# Patient Record
Sex: MALE | URBAN - METROPOLITAN AREA
[De-identification: names, ages, dates, MRNs, and addresses within clinical notes are randomized per-mention and may not be internally consistent; named-entity substitution may affect disease eponyms.]

---
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
## 2022-01-19 ENCOUNTER — RX ONLY (OUTPATIENT)
Age: 22
Setting detail: RX ONLY
End: 2022-01-19

## 2022-01-19 RX ORDER — ISOTRETINOIN 40 MG/1
1 CAPSULE ORAL BID
Qty: 60 | Refills: 0 | Status: ERX

## 2022-02-28 ENCOUNTER — APPOINTMENT (RX ONLY)
Dept: URBAN - METROPOLITAN AREA CLINIC 114 | Facility: CLINIC | Age: 22
Setting detail: DERMATOLOGY
End: 2022-02-28

## 2022-02-28 DIAGNOSIS — L70.0 ACNE VULGARIS: ICD-10-CM

## 2022-02-28 DIAGNOSIS — Z79.899 OTHER LONG TERM (CURRENT) DRUG THERAPY: ICD-10-CM

## 2022-02-28 DIAGNOSIS — K13.0 DISEASES OF LIPS: ICD-10-CM

## 2022-02-28 DIAGNOSIS — L85.3 XEROSIS CUTIS: ICD-10-CM

## 2022-02-28 PROCEDURE — ? PRESCRIPTION MEDICATION MANAGEMENT

## 2022-02-28 PROCEDURE — ? ORDER TESTS

## 2022-02-28 PROCEDURE — ? PRESCRIPTION

## 2022-02-28 PROCEDURE — 99214 OFFICE O/P EST MOD 30 MIN: CPT

## 2022-02-28 PROCEDURE — ? TREATMENT REGIMEN

## 2022-02-28 PROCEDURE — ? LAB REPORTS REVIEWED

## 2022-02-28 PROCEDURE — ? ISOTRETINOIN MONITORING

## 2022-02-28 PROCEDURE — ? COUNSELING

## 2022-02-28 PROCEDURE — ? MORPHOLOGY

## 2022-02-28 PROCEDURE — ? DIAGNOSIS COMMENT

## 2022-02-28 RX ORDER — ISOTRETINOIN 40 MG/1
CAPSULE ORAL
Qty: 60 | Refills: 0 | Status: ERX

## 2022-02-28 ASSESSMENT — LOCATION DETAILED DESCRIPTION DERM
LOCATION DETAILED: LEFT MID-UPPER BACK
LOCATION DETAILED: LEFT SUPERIOR VERMILION LIP
LOCATION DETAILED: RIGHT INFERIOR VERMILION LIP

## 2022-02-28 ASSESSMENT — LOCATION ZONE DERM
LOCATION ZONE: TRUNK
LOCATION ZONE: LIP

## 2022-02-28 ASSESSMENT — LOCATION SIMPLE DESCRIPTION DERM
LOCATION SIMPLE: LEFT LIP
LOCATION SIMPLE: LEFT UPPER BACK
LOCATION SIMPLE: RIGHT LIP

## 2022-02-28 NOTE — PROCEDURE: ORDER TESTS
Lab Facility: [id number]
Billing Type: Third-Party Bill
Performing Laboratory: [id number]
Bill For Surgical Tray: no
Expected Date Of Service: 03/25/2022

## 2022-02-28 NOTE — PROCEDURE: DIAGNOSIS COMMENT
Detail Level: Simple
Comment: Pt stated that his insurance “wouldn’t cover the higher dosage of 40mg PO BID” so he just didn’t fill the prescription or notify the office. Therefore, he has not been on medication for the last month. Discussed importance of letting us know if he is unable to fill prescription so changes can be made. His TG have decreased, but recommended he start fish oil at this time.
Render Risk Assessment In Note?: no

## 2022-02-28 NOTE — PROCEDURE: ISOTRETINOIN MONITORING
Nosebleeds Normal Treatment: I explained this is common when taking isotretinoin. I recommended saline mist in each nostril multiple times a day. If this worsens they will contact us.
Xerosis Normal Treatment: I recommended application of Cetaphil or CeraVe numerous times a day and before going to bed to all dry areas.
Detail Level: Zone
Dosing Month 1 (Required For Cumulative Dosing): 30mg BID
Male Completion Statement: After discussing his treatment course we decided to discontinue isotretinoin therapy at this time. He shouldn't donate blood for one month after the last dose. He should call with any new symptoms of depression.
Myalgia Monitoring: I explained this is common when taking isotretinoin. If this worsens they will contact us.
Use Enhanced Counseling Feature (Automatic): No
Cheilitis Aggressive Treatment: I recommended application of Vaseline or Aquaphor numerous times a day (as often as every hour) and before going to bed. I also prescribed a topical steroid for twice daily use.
Myalgia Treatment: I explained this is common when taking isotretinoin. If this worsens they will contact us. They may try OTC ibuprofen.
Weight Units: pounds
Headache Monitoring: I recommended monitoring the headaches for now. There is no evidence of increased intracranial pressure. They were instructed to call if the headaches are worsening.
What Is The Patient's Gender: Male
Xerosis Normal Treatment: I recommended application of Cetaphil or CeraVe numerous times a day going to bed to all dry areas.
Display Individual Monthly Dosage In The Note (If Yes Will Display All Dosages Which Are Not N/A): yes
Dosing Month 3 (Required For Cumulative Dosing): 40mg BID
Female Completion Statement: After discussing her treatment course we decided to discontinue isotretinoin therapy at this time. I explained that she would need to continue her birth control methods for at least one month after the last dosage. She should also get a pregnancy test one month after the last dose. She shouldn't donate blood for one month after the last dose. She should call with any new symptoms of depression.
Pounds Preamble Statement (Weight Entered In Details Tab): Reported Weight in pounds:
Retinoid Dermatitis Aggressive Treatment: I recommended more frequent application of Cetaphil or CeraVe to the areas of dermatitis. I also prescribed a topical steroid for twice daily use until the dermatitis resolves.
Months Of Therapy Completed: 1
Hypercholesterolemia Monitoring: I explained this is common when taking isotretinoin. We will monitor closely.
Kilograms Preamble Statement (Weight Entered In Details Tab): Reported Weight in kilograms:
Cheilitis Normal Treatment: I recommended application of Vaseline or Aquaphor numerous times a day (as often as every hour) and before going to bed.
Patient Weight (Optional But Required For Cumulative Dose-Numbers And Decimals Only): 180
Retinoid Dermatitis Normal Treatment: I recommended more frequent application of Cetaphil or CeraVe to the areas of dermatitis.
Counseling Text: I reviewed the side effect in detail. Patient should get monthly blood tests, not donate blood, not drive at night if vision affected, and not share medication.
Female Pregnancy Counseling Text: Female patients should also be on two forms of birth control while taking this medication and for one month after their last dose.
Xerosis Aggressive Treatment: I recommended application of Cetaphil or CeraVe numerous times a day going to bed to all dry areas. I also prescribed a topical steroid for twice daily use.
Xerosis Aggressive Treatment: I recommended application of Cetaphil or CeraVe numerous times a day and before going to bed to all dry areas. I also prescribed a topical steroid for twice daily use.
Target Cumulative Dosage (In Mg/Kg): 135
Use Therapeutic Ranged Or Therapeutic Target: please select Range or Target
Lower Range (In Mg/Kg): 120
Upper Range (In Mg/Kg): 150

## 2022-02-28 NOTE — PROCEDURE: ISOTRETINOIN MONITORING
Kilograms Preamble Statement (Weight Entered In Details Tab): Reported Weight in kilograms:
Female Completion Statement: After discussing her treatment course we decided to discontinue isotretinoin therapy at this time. I explained that she would need to continue her birth control methods for at least one month after the last dosage. She should also get a pregnancy test one month after the last dose. She shouldn't donate blood for one month after the last dose. She should call with any new symptoms of depression.
Dosing Month 1 (Required For Cumulative Dosing): 30mg BID
Months Of Therapy Completed: 1
Detail Level: Zone
Dosing Month 2 (Required For Cumulative Dosing): 40mg BID
Patient Weight (Optional But Required For Cumulative Dose-Numbers And Decimals Only): 180
Are Labs Available For Review?: Yes
Male Completion Statement: After discussing his treatment course we decided to discontinue isotretinoin therapy at this time. He shouldn't donate blood for one month after the last dose. He should call with any new symptoms of depression.
Completed Therapy?: No
Pounds Preamble Statement (Weight Entered In Details Tab): Reported Weight in pounds:
Weight Units: pounds
Headache Monitoring: I recommended monitoring the headaches for now. There is no evidence of increased intracranial pressure. They were instructed to call if the headaches are worsening.
Hypertriglyceridemia Monitoring: I explained this is common when taking isotretinoin. If this worsens they will contact us.
Use Therapeutic Ranged Or Therapeutic Target: Range
Xerosis Aggressive Treatment: I recommended application of Cetaphil or CeraVe numerous times a day and before going to bed to all dry areas. I also prescribed a topical steroid for twice daily use.
Hypertriglyceridemia Treatment: I explained this is common when taking isotretinoin. If this worsens they will contact us. They may try OTC ibuprofen.
Retinoid Dermatitis Normal Treatment: I recommended more frequent application of Cetaphil or CeraVe to the areas of dermatitis.
Lower Range (In Mg/Kg): 120
Retinoid Dermatitis Aggressive Treatment: I recommended more frequent application of Cetaphil or CeraVe to the areas of dermatitis. I also prescribed a topical steroid for twice daily use until the dermatitis resolves.
Cheilitis Normal Treatment: I recommended application of Vaseline or Aquaphor numerous times a day (as often as every hour) and before going to bed.
Upper Range (In Mg/Kg): 150
Xerosis Normal Treatment: I recommended application of Cetaphil or CeraVe numerous times a day going to bed to all dry areas.
Cheilitis Aggressive Treatment: I recommended application of Vaseline or Aquaphor numerous times a day (as often as every hour) and before going to bed. I also prescribed a topical steroid for twice daily use.
Nosebleeds Normal Treatment: I explained this is common when taking isotretinoin. I recommended saline mist in each nostril multiple times a day. If this worsens they will contact us.
Hypercholesterolemia Treatment: Daily Fish Oil
Target Cumulative Dosage (In Mg/Kg): 150
Counseling Text: I reviewed the side effect in detail. Patient should get monthly blood tests, not donate blood, not drive at night if vision affected, and not share medication.
Hypertriglyceridemia Monitoring: I explained this is common when taking isotretinoin. We will monitor closely.
What Is The Patient's Gender: Male
Xerosis Aggressive Treatment: I recommended application of Cetaphil or CeraVe numerous times a day going to bed to all dry areas. I also prescribed a topical steroid for twice daily use.
Female Pregnancy Counseling Text: Female patients should also be on two forms of birth control while taking this medication and for one month after their last dose.
Any Hypercholesterolemia?: Yes - Treatment
Xerosis Normal Treatment: I recommended application of Cetaphil or CeraVe numerous times a day and before going to bed to all dry areas.

## 2022-03-14 RX ORDER — ISOTRETINOIN 40 MG/1
1 CAPSULE ORAL BID
Qty: 60 | Refills: 0 | Status: CANCELLED

## 2022-03-28 ENCOUNTER — APPOINTMENT (RX ONLY)
Dept: URBAN - METROPOLITAN AREA CLINIC 114 | Facility: CLINIC | Age: 22
Setting detail: DERMATOLOGY
End: 2022-03-28

## 2022-03-28 DIAGNOSIS — L70.0 ACNE VULGARIS: ICD-10-CM

## 2022-03-28 DIAGNOSIS — Z79.899 OTHER LONG TERM (CURRENT) DRUG THERAPY: ICD-10-CM

## 2022-03-28 PROCEDURE — ? COUNSELING

## 2022-03-28 PROCEDURE — ? PRESCRIPTION MEDICATION MANAGEMENT

## 2022-03-28 PROCEDURE — ? PRESCRIPTION

## 2022-03-28 PROCEDURE — 99214 OFFICE O/P EST MOD 30 MIN: CPT

## 2022-03-28 PROCEDURE — ? ISOTRETINOIN MONITORING

## 2022-03-28 RX ORDER — ISOTRETINOIN 40 MG/1
CAPSULE ORAL
Qty: 60 | Refills: 0 | Status: ERX

## 2022-03-28 NOTE — PROCEDURE: PRESCRIPTION MEDICATION MANAGEMENT
Continue Regimen: Fish Oil QD
Render In Strict Bullet Format?: No
Plan: Recommend pt continue with fish oil and get labs done within  two weeks recommended pt only take the two weeks of Accutane pt triglycerides are elevated. Spoke with pt in length the importance of eating health and watching his carbohydrates intake while being on Accutane.
Detail Level: Zone

## 2022-03-28 NOTE — PROCEDURE: ISOTRETINOIN MONITORING
Nosebleeds Normal Treatment: I explained this is common when taking isotretinoin. I recommended saline mist in each nostril multiple times a day. If this worsens they will contact us.
Xerosis Normal Treatment: I recommended application of Cetaphil or CeraVe numerous times a day and before going to bed to all dry areas.
Detail Level: Zone
Any Myalgia?: No
Dosing Month 1 (Required For Cumulative Dosing): 30mg BID
Male Completion Statement: After discussing his treatment course we decided to discontinue isotretinoin therapy at this time. He shouldn't donate blood for one month after the last dose. He should call with any new symptoms of depression.
Myalgia Monitoring: I explained this is common when taking isotretinoin. If this worsens they will contact us.
Cheilitis Aggressive Treatment: I recommended application of Vaseline or Aquaphor numerous times a day (as often as every hour) and before going to bed. I also prescribed a topical steroid for twice daily use.
Myalgia Treatment: I explained this is common when taking isotretinoin. If this worsens they will contact us. They may try OTC ibuprofen.
Weight Units: pounds
Headache Monitoring: I recommended monitoring the headaches for now. There is no evidence of increased intracranial pressure. They were instructed to call if the headaches are worsening.
What Is The Patient's Gender: Male
Xerosis Normal Treatment: I recommended application of Cetaphil or CeraVe numerous times a day going to bed to all dry areas.
Display Individual Monthly Dosage In The Note (If Yes Will Display All Dosages Which Are Not N/A): yes
Dosing Month 3 (Required For Cumulative Dosing): 40mg BID
Female Completion Statement: After discussing her treatment course we decided to discontinue isotretinoin therapy at this time. I explained that she would need to continue her birth control methods for at least one month after the last dosage. She should also get a pregnancy test one month after the last dose. She shouldn't donate blood for one month after the last dose. She should call with any new symptoms of depression.
Pounds Preamble Statement (Weight Entered In Details Tab): Reported Weight in pounds:
Retinoid Dermatitis Aggressive Treatment: I recommended more frequent application of Cetaphil or CeraVe to the areas of dermatitis. I also prescribed a topical steroid for twice daily use until the dermatitis resolves.
Months Of Therapy Completed: 1
Hypercholesterolemia Monitoring: I explained this is common when taking isotretinoin. We will monitor closely.
Kilograms Preamble Statement (Weight Entered In Details Tab): Reported Weight in kilograms:
Is Cheilitis Present?: Yes - Normal Treatment
Cheilitis Normal Treatment: I recommended application of Vaseline or Aquaphor numerous times a day (as often as every hour) and before going to bed.
Patient Weight (Optional But Required For Cumulative Dose-Numbers And Decimals Only): 180
Retinoid Dermatitis Normal Treatment: I recommended more frequent application of Cetaphil or CeraVe to the areas of dermatitis.
Counseling Text: I reviewed the side effect in detail. Patient should get monthly blood tests, not donate blood, not drive at night if vision affected, and not share medication.
Female Pregnancy Counseling Text: Female patients should also be on two forms of birth control while taking this medication and for one month after their last dose.
Xerosis Aggressive Treatment: I recommended application of Cetaphil or CeraVe numerous times a day going to bed to all dry areas. I also prescribed a topical steroid for twice daily use.
Xerosis Aggressive Treatment: I recommended application of Cetaphil or CeraVe numerous times a day and before going to bed to all dry areas. I also prescribed a topical steroid for twice daily use.
Target Cumulative Dosage (In Mg/Kg): 150
Use Therapeutic Ranged Or Therapeutic Target: Target
Lower Range (In Mg/Kg): 120
Upper Range (In Mg/Kg): 150

## 2022-04-08 ENCOUNTER — APPOINTMENT (RX ONLY)
Dept: URBAN - METROPOLITAN AREA CLINIC 114 | Facility: CLINIC | Age: 22
Setting detail: DERMATOLOGY
End: 2022-04-08

## 2022-04-08 DIAGNOSIS — Z79.899 OTHER LONG TERM (CURRENT) DRUG THERAPY: ICD-10-CM

## 2022-04-08 PROCEDURE — ? ORDER TESTS

## 2022-04-08 NOTE — PROCEDURE: ORDER TESTS
Lab Facility: [id number]
Expected Date Of Service: 04/08/2022
Bill For Surgical Tray: no
Performing Laboratory: [id number]
Billing Type: Third-Party Bill
Billing Type: Third-Party Bill
Performing Laboratory: [id number]
Expected Date Of Service: 04/22/2022

## 2022-04-26 ENCOUNTER — APPOINTMENT (RX ONLY)
Dept: URBAN - METROPOLITAN AREA CLINIC 114 | Facility: CLINIC | Age: 22
Setting detail: DERMATOLOGY
End: 2022-04-26

## 2022-04-26 DIAGNOSIS — L70.0 ACNE VULGARIS: ICD-10-CM | Status: IMPROVED

## 2022-04-26 PROCEDURE — ? ISOTRETINOIN MONITORING

## 2022-04-26 PROCEDURE — ? PRESCRIPTION

## 2022-04-26 PROCEDURE — 99214 OFFICE O/P EST MOD 30 MIN: CPT

## 2022-04-26 RX ORDER — ISOTRETINOIN 40 MG/1
CAPSULE ORAL
Qty: 60 | Refills: 0 | Status: ERX

## 2022-04-26 ASSESSMENT — LOCATION ZONE DERM: LOCATION ZONE: FACE

## 2022-04-26 ASSESSMENT — LOCATION DETAILED DESCRIPTION DERM: LOCATION DETAILED: GLABELLA

## 2022-04-26 ASSESSMENT — LOCATION SIMPLE DESCRIPTION DERM: LOCATION SIMPLE: GLABELLA

## 2022-04-26 NOTE — PROCEDURE: MIPS QUALITY
None known
Detail Level: Detailed
Quality 110: Preventive Care And Screening: Influenza Immunization: Influenza Immunization Administered during Influenza season

## 2022-04-26 NOTE — PROCEDURE: ISOTRETINOIN MONITORING
Cheilitis Normal Treatment: I recommended application of Vaseline or Aquaphor numerous times a day (as often as every hour) and before going to bed.
Completed Therapy?: No
Detail Level: Zone
Hypercholesterolemia Monitoring: I explained this is common when taking isotretinoin. We will monitor closely.
Upper Range (In Mg/Kg): 150
Kilograms Preamble Statement (Weight Entered In Details Tab): Reported Weight in kilograms:
Headache Monitoring: I recommended monitoring the headaches for now. There is no evidence of increased intracranial pressure. They were instructed to call if the headaches are worsening.
Cheilitis Aggressive Treatment: I recommended application of Vaseline or Aquaphor numerous times a day (as often as every hour) and before going to bed. I also prescribed a topical steroid for twice daily use.
Myalgia Treatment: I explained this is common when taking isotretinoin. If this worsens they will contact us. They may try OTC ibuprofen.
Retinoid Dermatitis Normal Treatment: I recommended more frequent application of Cetaphil or CeraVe to the areas of dermatitis.
Months Of Therapy Completed: 3
Is Cheilitis Present?: Yes - Normal Treatment
Add Associated Diagnosis When Managing Medication Side Effects: Yes
Myalgia Monitoring: I explained this is common when taking isotretinoin. If this worsens they will contact us.
What Is The Patient's Gender: Male
Female Completion Statement: After discussing her treatment course we decided to discontinue isotretinoin therapy at this time. I explained that she would need to continue her birth control methods for at least one month after the last dosage. She should also get a pregnancy test one month after the last dose. She shouldn't donate blood for one month after the last dose. She should call with any new symptoms of depression.
Dosing Month 3 (Required For Cumulative Dosing): 40mg BID
Female Pregnancy Counseling Text: Female patients should also be on two forms of birth control while taking this medication and for one month after their last dose.
Nosebleeds Normal Treatment: I explained this is common when taking isotretinoin. I recommended saline mist in each nostril multiple times a day. If this worsens they will contact us.
Lower Range (In Mg/Kg): 120
Xerosis Aggressive Treatment: I recommended application of Cetaphil or CeraVe numerous times a day going to bed to all dry areas. I also prescribed a topical steroid for twice daily use.
Use Therapeutic Ranged Or Therapeutic Target: please select Range or Target
Counseling Text: I reviewed the side effect in detail. Patient should get monthly blood tests, not donate blood, not drive at night if vision affected, and not share medication.
Retinoid Dermatitis Aggressive Treatment: I recommended more frequent application of Cetaphil or CeraVe to the areas of dermatitis. I also prescribed a topical steroid for twice daily use until the dermatitis resolves.
Xerosis Normal Treatment: I recommended application of Cetaphil or CeraVe numerous times a day and before going to bed to all dry areas.
Xerosis Aggressive Treatment: I recommended application of Cetaphil or CeraVe numerous times a day and before going to bed to all dry areas. I also prescribed a topical steroid for twice daily use.
Dosing Month 1 (Required For Cumulative Dosing): 30mg BID
Weight Units: pounds
Target Cumulative Dosage (In Mg/Kg): 135
Pounds Preamble Statement (Weight Entered In Details Tab): Reported Weight in pounds:
Xerosis Normal Treatment: I recommended application of Cetaphil or CeraVe numerous times a day going to bed to all dry areas.
Male Completion Statement: After discussing his treatment course we decided to discontinue isotretinoin therapy at this time. He shouldn't donate blood for one month after the last dose. He should call with any new symptoms of depression.

## 2022-04-26 NOTE — PROCEDURE: MIPS QUALITY
Yes
Detail Level: Detailed
Quality 110: Preventive Care And Screening: Influenza Immunization: Influenza Immunization Administered during Influenza season

## 2022-05-24 ENCOUNTER — APPOINTMENT (RX ONLY)
Dept: URBAN - METROPOLITAN AREA CLINIC 114 | Facility: CLINIC | Age: 22
Setting detail: DERMATOLOGY
End: 2022-05-24

## 2022-05-24 DIAGNOSIS — L70.0 ACNE VULGARIS: ICD-10-CM

## 2022-05-24 PROCEDURE — ? ISOTRETINOIN MONITORING

## 2022-05-24 PROCEDURE — ? PRESCRIPTION MEDICATION MANAGEMENT

## 2022-05-24 PROCEDURE — ? DIAGNOSIS COMMENT

## 2022-05-24 PROCEDURE — 99214 OFFICE O/P EST MOD 30 MIN: CPT

## 2022-05-24 ASSESSMENT — LOCATION SIMPLE DESCRIPTION DERM: LOCATION SIMPLE: GLABELLA

## 2022-05-24 ASSESSMENT — LOCATION ZONE DERM: LOCATION ZONE: FACE

## 2022-05-24 ASSESSMENT — LOCATION DETAILED DESCRIPTION DERM: LOCATION DETAILED: GLABELLA

## 2022-05-24 NOTE — PROCEDURE: ISOTRETINOIN MONITORING
Cheilitis Normal Treatment: I recommended application of Vaseline or Aquaphor numerous times a day (as often as every hour) and before going to bed.
Completed Therapy?: No
Detail Level: Zone
Hypercholesterolemia Monitoring: I explained this is common when taking isotretinoin. We will monitor closely.
Upper Range (In Mg/Kg): 150
Patient Weight (Optional But Required For Cumulative Dose-Numbers And Decimals Only): 190
Kilograms Preamble Statement (Weight Entered In Details Tab): Reported Weight in kilograms:
Headache Monitoring: I recommended monitoring the headaches for now. There is no evidence of increased intracranial pressure. They were instructed to call if the headaches are worsening.
Cheilitis Aggressive Treatment: I recommended application of Vaseline or Aquaphor numerous times a day (as often as every hour) and before going to bed. I also prescribed a topical steroid for twice daily use.
Myalgia Treatment: I explained this is common when taking isotretinoin. If this worsens they will contact us. They may try OTC ibuprofen.
Retinoid Dermatitis Normal Treatment: I recommended more frequent application of Cetaphil or CeraVe to the areas of dermatitis.
Months Of Therapy Completed: 4
Is Cheilitis Present?: Yes - Normal Treatment
Add Associated Diagnosis When Managing Medication Side Effects: Yes
Myalgia Monitoring: I explained this is common when taking isotretinoin. If this worsens they will contact us.
What Is The Patient's Gender: Male
Female Completion Statement: After discussing her treatment course we decided to discontinue isotretinoin therapy at this time. I explained that she would need to continue her birth control methods for at least one month after the last dosage. She should also get a pregnancy test one month after the last dose. She shouldn't donate blood for one month after the last dose. She should call with any new symptoms of depression.
Dosing Month 3 (Required For Cumulative Dosing): 40mg BID
Female Pregnancy Counseling Text: Female patients should also be on two forms of birth control while taking this medication and for one month after their last dose.
Nosebleeds Normal Treatment: I explained this is common when taking isotretinoin. I recommended saline mist in each nostril multiple times a day. If this worsens they will contact us.
Lower Range (In Mg/Kg): 120
Xerosis Aggressive Treatment: I recommended application of Cetaphil or CeraVe numerous times a day going to bed to all dry areas. I also prescribed a topical steroid for twice daily use.
Use Therapeutic Ranged Or Therapeutic Target: please select Range or Target
Counseling Text: I reviewed the side effect in detail. Patient should get monthly blood tests, not donate blood, not drive at night if vision affected, and not share medication.
Retinoid Dermatitis Aggressive Treatment: I recommended more frequent application of Cetaphil or CeraVe to the areas of dermatitis. I also prescribed a topical steroid for twice daily use until the dermatitis resolves.
Xerosis Normal Treatment: I recommended application of Cetaphil or CeraVe numerous times a day and before going to bed to all dry areas.
Any Headache: No - Free Text
Xerosis Aggressive Treatment: I recommended application of Cetaphil or CeraVe numerous times a day and before going to bed to all dry areas. I also prescribed a topical steroid for twice daily use.
Dosing Month 1 (Required For Cumulative Dosing): 30mg BID
Weight Units: pounds
Target Cumulative Dosage (In Mg/Kg): 135
Pounds Preamble Statement (Weight Entered In Details Tab): Reported Weight in pounds:
Xerosis Normal Treatment: I recommended application of Cetaphil or CeraVe numerous times a day going to bed to all dry areas.
Male Completion Statement: After discussing his treatment course we decided to discontinue isotretinoin therapy at this time. He shouldn't donate blood for one month after the last dose. He should call with any new symptoms of depression.
Next Month's Dosage: Continue Current Dosage

## 2022-05-24 NOTE — PROCEDURE: PRESCRIPTION MEDICATION MANAGEMENT
Modify Regimen: -take 3 days break, if no symptoms occurring, restart/complete current dosage
Detail Level: Zone
Render In Strict Bullet Format?: No

## 2022-05-24 NOTE — PROCEDURE: DIAGNOSIS COMMENT
Detail Level: Simple
Render Risk Assessment In Note?: yes
Comment: Patient mentioned higher heart palpitations. Asked questions regarding possible correlation, saying onset started 1 week ago. Patient reported taking pre workout and other caffeine, instructed patient to discontinue pre workout/excessive caffeine intake, and to take a break from Accutane for a couple days. If patient heart symptoms continue/worsen, instructed patient to go to ER/ cardiologist. Instructed patient to start Accutane in 2-3 days, only if no heart palpitations/symptoms occurring. Patient reports having 2 weeks left, instructed to call when current dose is completed for telederm so can refill (only if no symptoms occurring).

## 2022-06-06 ENCOUNTER — APPOINTMENT (RX ONLY)
Dept: URBAN - METROPOLITAN AREA CLINIC 114 | Facility: CLINIC | Age: 22
Setting detail: DERMATOLOGY
End: 2022-06-06

## 2022-06-06 DIAGNOSIS — Z79.899 OTHER LONG TERM (CURRENT) DRUG THERAPY: ICD-10-CM

## 2022-06-06 DIAGNOSIS — L70.0 ACNE VULGARIS: ICD-10-CM

## 2022-06-06 PROCEDURE — ? ORDER TESTS

## 2022-06-06 PROCEDURE — ? ISOTRETINOIN MONITORING

## 2022-06-06 PROCEDURE — 99441: CPT

## 2022-06-06 PROCEDURE — ? PRESCRIPTION

## 2022-06-06 RX ORDER — ISOTRETINOIN 40 MG/1
CAPSULE ORAL
Qty: 60 | Refills: 0 | Status: ERX

## 2022-06-06 ASSESSMENT — LOCATION SIMPLE DESCRIPTION DERM: LOCATION SIMPLE: GLABELLA

## 2022-06-06 ASSESSMENT — LOCATION DETAILED DESCRIPTION DERM: LOCATION DETAILED: GLABELLA

## 2022-06-06 ASSESSMENT — LOCATION ZONE DERM: LOCATION ZONE: FACE

## 2022-06-06 NOTE — PROCEDURE: ORDER TESTS
Performing Laboratory: [id number]
Lab Facility: [id number]
Billing Type: Third-Party Bill
Bill For Surgical Tray: no
Expected Date Of Service: 06/06/2022

## 2022-07-05 ENCOUNTER — APPOINTMENT (RX ONLY)
Dept: URBAN - METROPOLITAN AREA CLINIC 114 | Facility: CLINIC | Age: 22
Setting detail: DERMATOLOGY
End: 2022-07-05

## 2022-07-05 DIAGNOSIS — L70.0 ACNE VULGARIS: ICD-10-CM

## 2022-07-05 DIAGNOSIS — Z79.899 OTHER LONG TERM (CURRENT) DRUG THERAPY: ICD-10-CM

## 2022-07-05 PROCEDURE — ? ORDER TESTS

## 2022-07-05 PROCEDURE — 99214 OFFICE O/P EST MOD 30 MIN: CPT

## 2022-07-05 PROCEDURE — ? ISOTRETINOIN MONITORING

## 2022-07-05 PROCEDURE — ? PRESCRIPTION

## 2022-07-05 RX ORDER — ISOTRETINOIN 40 MG/1
CAPSULE ORAL
Qty: 60 | Refills: 0 | Status: CANCELLED

## 2022-07-05 ASSESSMENT — LOCATION SIMPLE DESCRIPTION DERM: LOCATION SIMPLE: GLABELLA

## 2022-07-05 ASSESSMENT — LOCATION ZONE DERM: LOCATION ZONE: FACE

## 2022-07-05 ASSESSMENT — LOCATION DETAILED DESCRIPTION DERM: LOCATION DETAILED: GLABELLA

## 2022-07-05 NOTE — PROCEDURE: ORDER TESTS
Performing Laboratory: [id number]
Lab Facility: [id number]
Billing Type: Third-Party Bill
Bill For Surgical Tray: no
Expected Date Of Service: 06/06/2022

## 2022-07-05 NOTE — PROCEDURE: ISOTRETINOIN MONITORING
Cheilitis Normal Treatment: I recommended application of Vaseline or Aquaphor numerous times a day (as often as every hour) and before going to bed.
Completed Therapy?: No
Detail Level: Zone
Hypercholesterolemia Monitoring: I explained this is common when taking isotretinoin. We will monitor closely.
Upper Range (In Mg/Kg): 150
Patient Weight (Optional But Required For Cumulative Dose-Numbers And Decimals Only): 190
Kilograms Preamble Statement (Weight Entered In Details Tab): Reported Weight in kilograms:
Headache Monitoring: I recommended monitoring the headaches for now. There is no evidence of increased intracranial pressure. They were instructed to call if the headaches are worsening.
Cheilitis Aggressive Treatment: I recommended application of Vaseline or Aquaphor numerous times a day (as often as every hour) and before going to bed. I also prescribed a topical steroid for twice daily use.
Myalgia Treatment: I explained this is common when taking isotretinoin. If this worsens they will contact us. They may try OTC ibuprofen.
Retinoid Dermatitis Normal Treatment: I recommended more frequent application of Cetaphil or CeraVe to the areas of dermatitis.
Months Of Therapy Completed: 5
Is Cheilitis Present?: Yes - Normal Treatment
Add Associated Diagnosis When Managing Medication Side Effects: Yes
Myalgia Monitoring: I explained this is common when taking isotretinoin. If this worsens they will contact us.
What Is The Patient's Gender: Male
Female Completion Statement: After discussing her treatment course we decided to discontinue isotretinoin therapy at this time. I explained that she would need to continue her birth control methods for at least one month after the last dosage. She should also get a pregnancy test one month after the last dose. She shouldn't donate blood for one month after the last dose. She should call with any new symptoms of depression.
Dosing Month 3 (Required For Cumulative Dosing): 40mg BID
Female Pregnancy Counseling Text: Female patients should also be on two forms of birth control while taking this medication and for one month after their last dose.
Nosebleeds Normal Treatment: I explained this is common when taking isotretinoin. I recommended saline mist in each nostril multiple times a day. If this worsens they will contact us.
Lower Range (In Mg/Kg): 120
Xerosis Aggressive Treatment: I recommended application of Cetaphil or CeraVe numerous times a day going to bed to all dry areas. I also prescribed a topical steroid for twice daily use.
Use Therapeutic Ranged Or Therapeutic Target: please select Range or Target
Counseling Text: I reviewed the side effect in detail. Patient should get monthly blood tests, not donate blood, not drive at night if vision affected, and not share medication.
Retinoid Dermatitis Aggressive Treatment: I recommended more frequent application of Cetaphil or CeraVe to the areas of dermatitis. I also prescribed a topical steroid for twice daily use until the dermatitis resolves.
Xerosis Normal Treatment: I recommended application of Cetaphil or CeraVe numerous times a day and before going to bed to all dry areas.
Any Headache: No - Free Text
Xerosis Aggressive Treatment: I recommended application of Cetaphil or CeraVe numerous times a day and before going to bed to all dry areas. I also prescribed a topical steroid for twice daily use.
Dosing Month 1 (Required For Cumulative Dosing): 30mg BID
Weight Units: pounds
Target Cumulative Dosage (In Mg/Kg): 135
Pounds Preamble Statement (Weight Entered In Details Tab): Reported Weight in pounds:
Xerosis Normal Treatment: I recommended application of Cetaphil or CeraVe numerous times a day going to bed to all dry areas.
Male Completion Statement: After discussing his treatment course we decided to discontinue isotretinoin therapy at this time. He shouldn't donate blood for one month after the last dose. He should call with any new symptoms of depression.
Next Month's Dosage: Continue Current Dosage

## 2022-07-07 ENCOUNTER — APPOINTMENT (RX ONLY)
Dept: URBAN - METROPOLITAN AREA CLINIC 107 | Facility: CLINIC | Age: 22
Setting detail: DERMATOLOGY
End: 2022-07-07

## 2022-07-07 ENCOUNTER — APPOINTMENT (RX ONLY)
Dept: URBAN - METROPOLITAN AREA CLINIC 114 | Facility: CLINIC | Age: 22
Setting detail: DERMATOLOGY
End: 2022-07-07

## 2022-07-07 DIAGNOSIS — L70.0 ACNE VULGARIS: ICD-10-CM

## 2022-07-07 PROCEDURE — ? ISOTRETINOIN MONITORING

## 2022-07-07 PROCEDURE — ? ADDITIONAL NOTES

## 2022-07-07 ASSESSMENT — LOCATION DETAILED DESCRIPTION DERM: LOCATION DETAILED: GLABELLA

## 2022-07-07 ASSESSMENT — LOCATION SIMPLE DESCRIPTION DERM: LOCATION SIMPLE: GLABELLA

## 2022-07-07 ASSESSMENT — LOCATION ZONE DERM: LOCATION ZONE: FACE

## 2022-07-07 NOTE — PROCEDURE: ADDITIONAL NOTES
Additional Notes: Per pt request will like to stop Accutane once he is done with what he has at home. I will cancel Rx to [facility] per JG okay to stop.
Render Risk Assessment In Note?: no
Detail Level: Detailed

## 2023-09-10 NOTE — PROCEDURE: ISOTRETINOIN MONITORING
Cheilitis Normal Treatment: I recommended application of Vaseline or Aquaphor numerous times a day (as often as every hour) and before going to bed.
Completed Therapy?: No
Detail Level: Zone
Hypercholesterolemia Monitoring: I explained this is common when taking isotretinoin. We will monitor closely.
multiple medical complaints
Upper Range (In Mg/Kg): 150
Patient Weight (Optional But Required For Cumulative Dose-Numbers And Decimals Only): 190
Kilograms Preamble Statement (Weight Entered In Details Tab): Reported Weight in kilograms:
Headache Monitoring: I recommended monitoring the headaches for now. There is no evidence of increased intracranial pressure. They were instructed to call if the headaches are worsening.
Cheilitis Aggressive Treatment: I recommended application of Vaseline or Aquaphor numerous times a day (as often as every hour) and before going to bed. I also prescribed a topical steroid for twice daily use.
Myalgia Treatment: I explained this is common when taking isotretinoin. If this worsens they will contact us. They may try OTC ibuprofen.
Retinoid Dermatitis Normal Treatment: I recommended more frequent application of Cetaphil or CeraVe to the areas of dermatitis.
Months Of Therapy Completed: 5
Is Cheilitis Present?: Yes - Normal Treatment
Add Associated Diagnosis When Managing Medication Side Effects: Yes
Myalgia Monitoring: I explained this is common when taking isotretinoin. If this worsens they will contact us.
What Is The Patient's Gender: Male
Female Completion Statement: After discussing her treatment course we decided to discontinue isotretinoin therapy at this time. I explained that she would need to continue her birth control methods for at least one month after the last dosage. She should also get a pregnancy test one month after the last dose. She shouldn't donate blood for one month after the last dose. She should call with any new symptoms of depression.
Dosing Month 3 (Required For Cumulative Dosing): 40mg BID
Female Pregnancy Counseling Text: Female patients should also be on two forms of birth control while taking this medication and for one month after their last dose.
Nosebleeds Normal Treatment: I explained this is common when taking isotretinoin. I recommended saline mist in each nostril multiple times a day. If this worsens they will contact us.
Lower Range (In Mg/Kg): 120
Xerosis Aggressive Treatment: I recommended application of Cetaphil or CeraVe numerous times a day going to bed to all dry areas. I also prescribed a topical steroid for twice daily use.
Use Therapeutic Ranged Or Therapeutic Target: please select Range or Target
Counseling Text: I reviewed the side effect in detail. Patient should get monthly blood tests, not donate blood, not drive at night if vision affected, and not share medication.
Retinoid Dermatitis Aggressive Treatment: I recommended more frequent application of Cetaphil or CeraVe to the areas of dermatitis. I also prescribed a topical steroid for twice daily use until the dermatitis resolves.
Xerosis Normal Treatment: I recommended application of Cetaphil or CeraVe numerous times a day and before going to bed to all dry areas.
Any Headache: No - Free Text
Xerosis Aggressive Treatment: I recommended application of Cetaphil or CeraVe numerous times a day and before going to bed to all dry areas. I also prescribed a topical steroid for twice daily use.
Dosing Month 1 (Required For Cumulative Dosing): 30mg BID
Weight Units: pounds
Target Cumulative Dosage (In Mg/Kg): 135
Pounds Preamble Statement (Weight Entered In Details Tab): Reported Weight in pounds:
Xerosis Normal Treatment: I recommended application of Cetaphil or CeraVe numerous times a day going to bed to all dry areas.
Male Completion Statement: After discussing his treatment course we decided to discontinue isotretinoin therapy at this time. He shouldn't donate blood for one month after the last dose. He should call with any new symptoms of depression.
Next Month's Dosage: Continue Current Dosage